# Patient Record
(demographics unavailable — no encounter records)

---

## 2024-11-12 NOTE — ASSESSMENT
[FreeTextEntry1] : Large joint injection of the left shoulder was performed. The indication for this procedure was pain, inflammation. The site was prepped with alcohol, ethyl chloride sprayed topically and sterile technique used. An injection of Lidocaine 3cc of 2% , Bupivacaine (Marcaine) 3cc of 0.5% , Triamcinolone (Kenalog) 2cc of 40 mg  was used.   Patient tolerated procedure well. Patient was advised to call if redness, pain or fever occur and apply ice for 15 minutes out of every hour for the next 12-24 hours as tolerated. The risks benefits, and alternatives have been discussed, and verbal consent was obtained.   We discussed that too many injections may lead to weakening of the tendon/tendon rupture and the safety of two injections. After a discussion of the risks, benefits and alternatives along with the expectations, the patient was amenable to injection.  The indication for injection is pain and inflammation.  The skin overlying the tendon sheath site was prepared with alcohol and ethyl chloride was sprayed topically.  Sterile technique was used. An injection of 1ml of lidocaine and 6mg of betamethasone was used.  The patient was instructed to call if redness, pain or fever occur.  They may apply ice for 15 minutes every hour for the next 12-24 hours as tolerated.  The patient understands that it may take 2-5 days to see a noticeable difference.  Sterile Band-Aid was applied.   The patient was advised of the diagnosis. The natural history of the pathology was explained in full to the patient in layman's terms. All questions were answered. The risks and benefits of surgical and non-surgical treatment alternatives were explained in full to the patient.  CSI performed to left shoulder tendon sheath and the subacromial space  Discussed smoking cessation with patient.   Follow up 4 weeks.

## 2024-11-12 NOTE — IMAGING
[de-identified] : Left shoulder: Positive impingement signs FAROM with painful arc. Skin intact  No deformity Tenderness anterior shoulder joint and biceps tendon Pos speed Neg yergason

## 2024-11-12 NOTE — HISTORY OF PRESENT ILLNESS
[Dull/Aching] : dull/aching [Localized] : localized [Tightness] : tightness [de-identified] : 11/06/24: Patient is a 61 y/o female here for left shoulder pain. Patient has been having pain in the left shoulder for several months. She cannot lift her arm up without pain when lying down and cannot extend it behind her. Hit her shoulder in a doorway about 2-3 weeks ago. exacerbation of chronic condition as she has had this before [] : no [FreeTextEntry1] : LFT shoulder  [FreeTextEntry5] : LFT shoulder pain that worsen a few weeks ago from collision w/ door.

## 2024-11-12 NOTE — HISTORY OF PRESENT ILLNESS
[Dull/Aching] : dull/aching [Localized] : localized [Tightness] : tightness [de-identified] : 11/06/24: Patient is a 61 y/o female here for left shoulder pain. Patient has been having pain in the left shoulder for several months. She cannot lift her arm up without pain when lying down and cannot extend it behind her. Hit her shoulder in a doorway about 2-3 weeks ago. exacerbation of chronic condition as she has had this before [] : no [FreeTextEntry1] : LFT shoulder  [FreeTextEntry5] : LFT shoulder pain that worsen a few weeks ago from collision w/ door.

## 2024-11-12 NOTE — IMAGING
[de-identified] : Left shoulder: Positive impingement signs FAROM with painful arc. Skin intact  No deformity Tenderness anterior shoulder joint and biceps tendon Pos speed Neg yergason

## 2025-02-05 NOTE — IMAGING
[de-identified] : Left shoulder: FAROM with painful arc. Pain with IR + speeds TTP distal - yergason's - impingement Skin intact  No deformity Tenderness anterior shoulder joint and proximal biceps tendon.  left thumb base with shoulder deformity at the cmc.  TTP and positive basal grind at the cmc. median/ulnar/radial serve sensation intact ain/pin/ulnar motor intact palpable pulsres CR<2s full active range of motion otherwise    Left wrist x-rays  3 views taken today in office demonstrate 1st cmc djd

## 2025-02-05 NOTE — ASSESSMENT
[FreeTextEntry1] : The patient was advised of the diagnosis. The natural history of the pathology was explained in full to the patient in layman's terms. We reviewed that the forces applied to the thumb tip are magnified 12-14 times at the thumb cmc joint.  We also reviewed the progression of arthritis with early arthritis showing minimal to no xray changes.  We discussed treatment options, including activity modification(demonstrated), medicine(topical and oral), bracing, therapy, injection and surgery.  We reviewed the r/b of each and post op expectations/course.  All questions were answered and the patient verbalized understanding   Voltaren rx Naproxen rx NSAIDs recommended.  Patient warned of risk of NSAID medication to stomach and GI tract, risk of increase blood pressure, cardiac risk, and risk of fluid retention.  The patient should clear taking medication with internist/PMD if any problem with heart, blood pressure, or GI system exists.   PT rx  Follow up 6 weeks.

## 2025-02-05 NOTE — HISTORY OF PRESENT ILLNESS
[Dull/Aching] : dull/aching [Localized] : localized [Tightness] : tightness [de-identified] : 02/05/2025: Patient is here today for left shoulder pain, describes having an occasional sensation where she feels she needs to shake her arm out. She had a cardiac workup and stress test. Left thumb pain has been present for several months.   11/06/24: Patient is a 61 y/o female here for left shoulder pain. Patient has been having pain in the left shoulder for several months. She cannot lift her arm up without pain when lying down and cannot extend it behind her. Hit her shoulder in a doorway about 2-3 weeks ago. exacerbation of chronic condition as she has had this before [] : no [FreeTextEntry1] : LFT shoulder  [FreeTextEntry5] : now LFT thumb pain